# Patient Record
Sex: MALE | Race: WHITE | NOT HISPANIC OR LATINO
[De-identification: names, ages, dates, MRNs, and addresses within clinical notes are randomized per-mention and may not be internally consistent; named-entity substitution may affect disease eponyms.]

---

## 2017-01-11 ENCOUNTER — MESSAGE (OUTPATIENT)
Age: 67
End: 2017-01-11

## 2017-04-03 ENCOUNTER — APPOINTMENT (OUTPATIENT)
Dept: UROLOGY | Facility: CLINIC | Age: 67
End: 2017-04-03

## 2017-08-31 ENCOUNTER — MEDICATION RENEWAL (OUTPATIENT)
Age: 67
End: 2017-08-31

## 2017-11-29 ENCOUNTER — NON-APPOINTMENT (OUTPATIENT)
Age: 67
End: 2017-11-29

## 2017-11-29 ENCOUNTER — APPOINTMENT (OUTPATIENT)
Dept: INTERNAL MEDICINE | Facility: CLINIC | Age: 67
End: 2017-11-29
Payer: MEDICARE

## 2017-11-29 VITALS — SYSTOLIC BLOOD PRESSURE: 135 MMHG | DIASTOLIC BLOOD PRESSURE: 84 MMHG

## 2017-11-29 VITALS
SYSTOLIC BLOOD PRESSURE: 126 MMHG | HEART RATE: 72 BPM | DIASTOLIC BLOOD PRESSURE: 90 MMHG | TEMPERATURE: 97.8 F | HEIGHT: 68.5 IN | BODY MASS INDEX: 28.77 KG/M2 | WEIGHT: 192 LBS | OXYGEN SATURATION: 95 %

## 2017-11-29 DIAGNOSIS — Z12.11 ENCOUNTER FOR SCREENING FOR MALIGNANT NEOPLASM OF COLON: ICD-10-CM

## 2017-11-29 DIAGNOSIS — Z92.29 PERSONAL HISTORY OF OTHER DRUG THERAPY: ICD-10-CM

## 2017-11-29 DIAGNOSIS — Z87.09 PERSONAL HISTORY OF OTHER DISEASES OF THE RESPIRATORY SYSTEM: ICD-10-CM

## 2017-11-29 DIAGNOSIS — F41.9 ANXIETY DISORDER, UNSPECIFIED: ICD-10-CM

## 2017-11-29 DIAGNOSIS — Z23 ENCOUNTER FOR IMMUNIZATION: ICD-10-CM

## 2017-11-29 PROCEDURE — 99213 OFFICE O/P EST LOW 20 MIN: CPT | Mod: 25

## 2017-11-29 PROCEDURE — 82270 OCCULT BLOOD FECES: CPT

## 2017-11-29 PROCEDURE — G0008: CPT

## 2017-11-29 PROCEDURE — 93000 ELECTROCARDIOGRAM COMPLETE: CPT | Mod: 59

## 2017-11-29 PROCEDURE — G0438: CPT

## 2017-11-29 PROCEDURE — 90662 IIV NO PRSV INCREASED AG IM: CPT

## 2017-11-30 LAB
25(OH)D3 SERPL-MCNC: 34.8 NG/ML
APPEARANCE: CLEAR
BACTERIA: NEGATIVE
BASOPHILS # BLD AUTO: 0.01 K/UL
BASOPHILS NFR BLD AUTO: 0.2 %
BILIRUBIN URINE: NEGATIVE
BLOOD URINE: NEGATIVE
CHOLEST SERPL-MCNC: 228 MG/DL
CHOLEST/HDLC SERPL: 4.1 RATIO
COLOR: YELLOW
EOSINOPHIL # BLD AUTO: 0.09 K/UL
EOSINOPHIL NFR BLD AUTO: 1.5
GLUCOSE QUALITATIVE U: NEGATIVE MG/DL
HBA1C MFR BLD HPLC: 5.6 %
HCT VFR BLD CALC: 45.9 %
HDLC SERPL-MCNC: 55 MG/DL
HGB BLD-MCNC: 15.4 G/DL
HYALINE CASTS: 0 /LPF
IMM GRANULOCYTES NFR BLD AUTO: 0.2 %
KETONES URINE: NEGATIVE
LDLC SERPL CALC-MCNC: 153 MG/DL
LEUKOCYTE ESTERASE URINE: NEGATIVE
LYMPHOCYTES # BLD AUTO: 2.22 K/UL
LYMPHOCYTES NFR BLD AUTO: 36.5 %
MAN DIFF?: NORMAL
MCHC RBC-ENTMCNC: 33.6 PG
MICROSCOPIC-UA: NORMAL
MONOCYTES # BLD AUTO: 0.47 K/UL
MONOCYTES NFR BLD AUTO: 7.7 %
NEUTROPHILS # BLD AUTO: 3.29 K/UL
NEUTROPHILS NFR BLD AUTO: 53.9 %
NITRITE URINE: NEGATIVE
PH URINE: 5.5
PLATELET # BLD AUTO: 214 K/UL
PROTEIN URINE: NEGATIVE MG/DL
PSA SERPL-MCNC: 4 NG/ML
RBC # BLD: 4.58 M/UL
RED BLOOD CELLS URINE: 5 /HPF
SPECIFIC GRAVITY URINE: 1.02
SQUAMOUS EPITHELIAL CELLS: 0 /HPF
T4 FREE SERPL-MCNC: 1.3 NG/DL
TRIGL SERPL-MCNC: 100 MG/DL
TSH SERPL-ACNC: 1.96 UIU/ML
UROBILINOGEN URINE: NEGATIVE MG/DL
WHITE BLOOD CELLS URINE: 1 /HPF

## 2018-01-09 ENCOUNTER — CHART COPY (OUTPATIENT)
Age: 68
End: 2018-01-09

## 2018-02-28 ENCOUNTER — APPOINTMENT (OUTPATIENT)
Dept: INTERNAL MEDICINE | Facility: CLINIC | Age: 68
End: 2018-02-28
Payer: MEDICARE

## 2018-02-28 VITALS
BODY MASS INDEX: 27.2 KG/M2 | HEIGHT: 70 IN | OXYGEN SATURATION: 95 % | HEART RATE: 64 BPM | SYSTOLIC BLOOD PRESSURE: 120 MMHG | WEIGHT: 190 LBS | TEMPERATURE: 97.9 F | DIASTOLIC BLOOD PRESSURE: 80 MMHG

## 2018-02-28 VITALS — DIASTOLIC BLOOD PRESSURE: 78 MMHG | SYSTOLIC BLOOD PRESSURE: 114 MMHG

## 2018-02-28 LAB
ALBUMIN SERPL ELPH-MCNC: 4.1 G/DL
ALP BLD-CCNC: 39 U/L
ALT SERPL-CCNC: 29 U/L
ANION GAP SERPL CALC-SCNC: 15 MMOL/L
AST SERPL-CCNC: 24 U/L
BILIRUB SERPL-MCNC: 0.5 MG/DL
BUN SERPL-MCNC: 17 MG/DL
CALCIUM SERPL-MCNC: 9.2 MG/DL
CHLORIDE SERPL-SCNC: 95 MMOL/L
CO2 SERPL-SCNC: 21 MMOL/L
CREAT SERPL-MCNC: 0.92 MG/DL
GLUCOSE SERPL-MCNC: 98 MG/DL
POTASSIUM SERPL-SCNC: 4.1 MMOL/L
PROT SERPL-MCNC: 7.2 G/DL
SODIUM SERPL-SCNC: 131 MMOL/L

## 2018-02-28 PROCEDURE — 99214 OFFICE O/P EST MOD 30 MIN: CPT

## 2018-03-08 ENCOUNTER — RX RENEWAL (OUTPATIENT)
Age: 68
End: 2018-03-08

## 2018-03-13 LAB
CHOLEST SERPL-MCNC: 254 MG/DL
CHOLEST/HDLC SERPL: 4.6 RATIO
HBA1C MFR BLD HPLC: 5.8 %
HDLC SERPL-MCNC: 55 MG/DL
LDLC SERPL CALC-MCNC: 173 MG/DL
TRIGL SERPL-MCNC: 132 MG/DL

## 2018-03-17 LAB — HEMOCCULT STL QL IA: NEGATIVE

## 2018-05-22 ENCOUNTER — MESSAGE (OUTPATIENT)
Age: 68
End: 2018-05-22

## 2018-06-01 ENCOUNTER — APPOINTMENT (OUTPATIENT)
Dept: INTERNAL MEDICINE | Facility: CLINIC | Age: 68
End: 2018-06-01
Payer: MEDICARE

## 2018-06-01 VITALS — SYSTOLIC BLOOD PRESSURE: 138 MMHG | DIASTOLIC BLOOD PRESSURE: 88 MMHG

## 2018-06-01 VITALS
DIASTOLIC BLOOD PRESSURE: 90 MMHG | SYSTOLIC BLOOD PRESSURE: 134 MMHG | HEART RATE: 71 BPM | BODY MASS INDEX: 27.46 KG/M2 | HEIGHT: 70.5 IN | TEMPERATURE: 97.9 F | WEIGHT: 194 LBS | OXYGEN SATURATION: 94 %

## 2018-06-01 PROCEDURE — 36415 COLL VENOUS BLD VENIPUNCTURE: CPT

## 2018-06-01 PROCEDURE — 99214 OFFICE O/P EST MOD 30 MIN: CPT | Mod: 25

## 2018-06-26 LAB
ALBUMIN SERPL ELPH-MCNC: 4.4 G/DL
ALP BLD-CCNC: 42 U/L
ALT SERPL-CCNC: 26 U/L
ANA SER IF-ACNC: NEGATIVE
ANION GAP SERPL CALC-SCNC: 17 MMOL/L
AST SERPL-CCNC: 25 U/L
BASOPHILS # BLD AUTO: 0.02 K/UL
BASOPHILS NFR BLD AUTO: 0.3 %
BILIRUB SERPL-MCNC: 0.5 MG/DL
BUN SERPL-MCNC: 19 MG/DL
CALCIUM SERPL-MCNC: 9.3 MG/DL
CHLORIDE SERPL-SCNC: 95 MMOL/L
CO2 SERPL-SCNC: 22 MMOL/L
CREAT SERPL-MCNC: 0.85 MG/DL
EOSINOPHIL # BLD AUTO: 0.18 K/UL
EOSINOPHIL NFR BLD AUTO: 2.6 %
GLUCOSE SERPL-MCNC: 89 MG/DL
HCT VFR BLD CALC: 45.6 %
HGB BLD-MCNC: 15.4 G/DL
IMM GRANULOCYTES NFR BLD AUTO: 0.1 %
LYMPHOCYTES # BLD AUTO: 3.09 K/UL
LYMPHOCYTES NFR BLD AUTO: 45.2 %
MAN DIFF?: NORMAL
MCHC RBC-ENTMCNC: 33.3 PG
MCHC RBC-ENTMCNC: 33.8 GM/DL
MCV RBC AUTO: 98.5 FL
MONOCYTES # BLD AUTO: 0.45 K/UL
MONOCYTES NFR BLD AUTO: 6.6 %
NEUTROPHILS # BLD AUTO: 3.09 K/UL
NEUTROPHILS NFR BLD AUTO: 45.2 %
PLATELET # BLD AUTO: 221 K/UL
POTASSIUM SERPL-SCNC: 4.3 MMOL/L
PROT SERPL-MCNC: 7.5 G/DL
RBC # BLD: 4.63 M/UL
RBC # FLD: 14.1 %
RHEUMATOID FACT SER QL: <7 IU/ML
SODIUM SERPL-SCNC: 134 MMOL/L
WBC # FLD AUTO: 6.84 K/UL

## 2018-06-26 NOTE — HISTORY OF PRESENT ILLNESS
[de-identified] : Patient had been seeing Dr. Smith for trigger finger, and had two injections in the right 2nd digit.  He was told that surgery would be needed if this is not effective.  However, it was also noted by the patient and Dr. Smith that there was generalized swelling of the hands (mainly digits) bilaterally.  Patient says that he has pain in the hands when he is sitting or sleeping.  He sometimes wakes up during the night.  His nephew once gave him a vigorous handshake, and it was painful.  The symptoms are less problematic during the daytime.  There is no redness or warmth.\par \par Patient has been checking his blood pressure on his own.  The lowest SBP was 119, and the highest is generally in the 140s, but he once had 170.  The DBPs are mainly 70s-80s.  He has no headaches, dizziness, lightheadedness, chest pain, or dyspnea.  He reports that he is taking his medications regularly, but occasionally will skip it if his blood pressure is "good" (such as ).  He is not taking any decongestants.  He does drink about 8 cups of regular coffee daily, which he is reluctant to stop doing.\par \par Patient tolerates the lisinopril.  There is an occasional cough, which he feels is related to a post-nasal drip, and which responds to Claritin.\par \par For the hyperlipidemia, patient started the flush free niacin about 5/25/18.  He is tolerating it thus far.  He did not tolerate statins well and does not wish to return to them.\par \par \par he feels like something hanging in the throat, and sometimes coughs.  Claritin helps.

## 2018-06-26 NOTE — PHYSICAL EXAM
[No Respiratory Distress] : no respiratory distress  [Clear to Auscultation] : lungs were clear to auscultation bilaterally [No Accessory Muscle Use] : no accessory muscle use [Normal Rate] : normal rate  [Regular Rhythm] : with a regular rhythm [Normal S1, S2] : normal S1 and S2 [No Murmur] : no murmur heard [Soft] : abdomen soft [Non Tender] : non-tender [Non-distended] : non-distended [Normal Bowel Sounds] : normal bowel sounds [de-identified] : No LE edema.  Mild swelling in all 10 digits of the hands bilaterally.  No erythema. [de-identified] : Full ROM of the fingers.

## 2018-06-26 NOTE — ASSESSMENT
[FreeTextEntry1] : (1) Hypertension - patient with somewhat labile BP, but SBPs are >130 a considerable portion of the time.  Will raise the lisinopril to 20mg daily, and patient is to contact me if he has SBPs <100, or if he gets dizziness, lightheadedness, or other concerning symptoms.  He has an occasional cough that appears to be from post-nasal drip and responds to Claritin.\par \par (2) Hyperlipidemia - patient only started the flush free niacin 1 week ago, so will defer a lipid check to the next visit.\par \par (3) Hand swelling - unclear cause at this time.  Will send SHANTI, RF, and patient also has an appointment to see rheumatology for further evaluation.

## 2018-06-26 NOTE — ADDENDUM
[FreeTextEntry1] : 6/26/18 - Spoke with patient at 11:10AM.  SHANTI, RF negative.  Patient seeing rheumatology next month.  Na+ 134 (up from 131).  Patient to cut back on free water intake.

## 2018-07-30 ENCOUNTER — APPOINTMENT (OUTPATIENT)
Dept: RHEUMATOLOGY | Facility: CLINIC | Age: 68
End: 2018-07-30
Payer: MEDICARE

## 2018-07-30 VITALS
DIASTOLIC BLOOD PRESSURE: 88 MMHG | RESPIRATION RATE: 16 BRPM | HEIGHT: 70.5 IN | BODY MASS INDEX: 26.83 KG/M2 | OXYGEN SATURATION: 98 % | HEART RATE: 70 BPM | SYSTOLIC BLOOD PRESSURE: 131 MMHG | WEIGHT: 189.5 LBS | TEMPERATURE: 97.8 F

## 2018-07-30 DIAGNOSIS — M79.641 PAIN IN RIGHT HAND: ICD-10-CM

## 2018-07-30 DIAGNOSIS — M79.642 PAIN IN RIGHT HAND: ICD-10-CM

## 2018-07-30 PROCEDURE — 99204 OFFICE O/P NEW MOD 45 MIN: CPT

## 2018-07-30 RX ORDER — DICLOFENAC SODIUM 10 MG/G
1 GEL TOPICAL DAILY
Qty: 1 | Refills: 1 | Status: ACTIVE | COMMUNITY
Start: 2018-07-30 | End: 1900-01-01

## 2018-07-31 ENCOUNTER — APPOINTMENT (OUTPATIENT)
Dept: INTERNAL MEDICINE | Facility: CLINIC | Age: 68
End: 2018-07-31
Payer: MEDICARE

## 2018-07-31 VITALS
SYSTOLIC BLOOD PRESSURE: 138 MMHG | HEART RATE: 73 BPM | WEIGHT: 192 LBS | BODY MASS INDEX: 27.8 KG/M2 | DIASTOLIC BLOOD PRESSURE: 90 MMHG | OXYGEN SATURATION: 95 % | HEIGHT: 69.5 IN | TEMPERATURE: 98.3 F

## 2018-07-31 DIAGNOSIS — M79.89 OTHER SPECIFIED SOFT TISSUE DISORDERS: ICD-10-CM

## 2018-07-31 PROCEDURE — 99214 OFFICE O/P EST MOD 30 MIN: CPT | Mod: 25

## 2018-07-31 PROCEDURE — 36415 COLL VENOUS BLD VENIPUNCTURE: CPT

## 2018-07-31 RX ORDER — UBIDECARENONE/VIT E ACET 100MG-5
CAPSULE ORAL
Refills: 0 | Status: COMPLETED | COMMUNITY

## 2018-08-01 PROBLEM — M79.89 HAND SWELLING: Status: ACTIVE | Noted: 2018-06-01

## 2018-08-01 NOTE — PHYSICAL EXAM
[No Respiratory Distress] : no respiratory distress  [Clear to Auscultation] : lungs were clear to auscultation bilaterally [No Accessory Muscle Use] : no accessory muscle use [Normal Rate] : normal rate  [Regular Rhythm] : with a regular rhythm [Normal S1, S2] : normal S1 and S2 [No Murmur] : no murmur heard [No Edema] : there was no peripheral edema [Soft] : abdomen soft [Non Tender] : non-tender [Non-distended] : non-distended [No Masses] : no abdominal mass palpated [Normal Bowel Sounds] : normal bowel sounds

## 2018-08-01 NOTE — HISTORY OF PRESENT ILLNESS
[de-identified] : Patient for follow-up of hyperlipidemia, labile hypertension, and hyponatremia (last Na+ 134).  Patient is on the flush free niacin 500mg once daily since 5/25/18.  He reports that he has been tolerating it well.  He did not tolerate the statins well in the past.  For the hypertension, he is on lisinopril 20mg daily.\par \par Patient saw Dr. Garcia for the hand pain and stiffness, and a work-up is underway.  Patient has a script for blood work from Dr. Garcia, and he is also to get a repeat hand x-ray.  Patient requests that these bloods be drawn today.\par \par Patient had severe hip pain last month while he was [facility].  He saw Dr. Ameya Menjivar (orthopedics).  He was found to have herniated discs in the lower spine.  Patient is taking Aleve and Tylenol.  Patient was given naproxen 500mg bid (prescription) and cyclobenzaprine, although he hasn't started those medications yet.  He also got topical voltaren.  Patient noted interactions between lisinopril and NSAIDS (increased risk for MARIA DOLORES), and issues with BPH.  Patient also notes that he has an appointment with a Neurologist who is a certified medical marijuana provider, in the hopes of finding additional options for dealing with the pain.\par \par Patient is dealing with his mother's estate, and it is keeping him up at nights.

## 2018-08-01 NOTE — ASSESSMENT
[FreeTextEntry1] : (1) CV - patient's BP is 138/90.  He is concerned about what he has read about using ACE inhibitors and NSAIDs together, and their impact on the kidneys.  Will change patient from lisinopril to amlodipine 5mg daily.  Patient advised that the amlodipine can possibly cause an allergic reaction, GI upset/constipation, and leg swelling.  He is to call me if these occur.  For the lipids, patient has been on flush free niacin, and lipids were repeated today.   Patient is to follow-up with me in 1 month.\par \par (2) Ortho/pain - Patient also advised that the NSAIDs can cause worsening hypertension, gastritis/PUD, and kidney injury (even without the ACE inhibitor).  He can use the Voltaren Gel and Tylenol, and also the naproxen if he feels he needs it.  He says he has a script for physical therapy from the orthopedist.\par \par (3) Rheum - labs ordered by Dr. Church were ordered today.\par \par (4) Hyponatremia - Na+ 134 in recent visit, and will repeat today.

## 2018-08-02 ENCOUNTER — FORM ENCOUNTER (OUTPATIENT)
Age: 68
End: 2018-08-02

## 2018-08-03 ENCOUNTER — APPOINTMENT (OUTPATIENT)
Dept: RADIOLOGY | Facility: CLINIC | Age: 68
End: 2018-08-03
Payer: MEDICARE

## 2018-08-03 ENCOUNTER — OUTPATIENT (OUTPATIENT)
Dept: OUTPATIENT SERVICES | Facility: HOSPITAL | Age: 68
LOS: 1 days | End: 2018-08-03
Payer: MEDICARE

## 2018-08-03 DIAGNOSIS — Z00.8 ENCOUNTER FOR OTHER GENERAL EXAMINATION: ICD-10-CM

## 2018-08-03 PROCEDURE — 73130 X-RAY EXAM OF HAND: CPT

## 2018-08-03 PROCEDURE — 73130 X-RAY EXAM OF HAND: CPT | Mod: 26,50

## 2018-08-06 ENCOUNTER — RX RENEWAL (OUTPATIENT)
Age: 68
End: 2018-08-06

## 2018-08-24 ENCOUNTER — MEDICATION RENEWAL (OUTPATIENT)
Age: 68
End: 2018-08-24

## 2018-08-24 LAB
ALBUMIN SERPL ELPH-MCNC: 4.5 G/DL
ALP BLD-CCNC: 43 U/L
ALT SERPL-CCNC: 27 U/L
ANION GAP SERPL CALC-SCNC: 12 MMOL/L
AST SERPL-CCNC: 24 U/L
BASOPHILS # BLD AUTO: 0.02 K/UL
BASOPHILS NFR BLD AUTO: 0.3 %
BILIRUB SERPL-MCNC: 0.5 MG/DL
BUN SERPL-MCNC: 17 MG/DL
CALCIUM SERPL-MCNC: 9.3 MG/DL
CCP AB SER IA-ACNC: <8 UNITS
CHLORIDE SERPL-SCNC: 95 MMOL/L
CHOLEST SERPL-MCNC: 226 MG/DL
CHOLEST/HDLC SERPL: 4.2 RATIO
CO2 SERPL-SCNC: 23 MMOL/L
CREAT SERPL-MCNC: 0.92 MG/DL
CRP SERPL-MCNC: <0.1 MG/DL
EOSINOPHIL # BLD AUTO: 0.15 K/UL
EOSINOPHIL NFR BLD AUTO: 2.3 %
ERYTHROCYTE [SEDIMENTATION RATE] IN BLOOD BY WESTERGREN METHOD: 17 MM/HR
GLUCOSE SERPL-MCNC: 102 MG/DL
HCT VFR BLD CALC: 46.4 %
HDLC SERPL-MCNC: 54 MG/DL
HGB BLD-MCNC: 15.8 G/DL
IMM GRANULOCYTES NFR BLD AUTO: 0.2 %
LDLC SERPL CALC-MCNC: 147 MG/DL
LYMPHOCYTES # BLD AUTO: 2.86 K/UL
LYMPHOCYTES NFR BLD AUTO: 43.9 %
MAN DIFF?: NORMAL
MCHC RBC-ENTMCNC: 33.5 PG
MCHC RBC-ENTMCNC: 34.1 GM/DL
MCV RBC AUTO: 98.3 FL
MONOCYTES # BLD AUTO: 0.49 K/UL
MONOCYTES NFR BLD AUTO: 7.5 %
NEUTROPHILS # BLD AUTO: 2.99 K/UL
NEUTROPHILS NFR BLD AUTO: 45.8 %
PLATELET # BLD AUTO: 227 K/UL
POTASSIUM SERPL-SCNC: 4.6 MMOL/L
PROT SERPL-MCNC: 7.5 G/DL
RBC # BLD: 4.72 M/UL
RBC # FLD: 13.7 %
RF+CCP IGG SER-IMP: NEGATIVE
SODIUM SERPL-SCNC: 130 MMOL/L
TRIGL SERPL-MCNC: 123 MG/DL
TSH SERPL-ACNC: 3.07 UIU/ML
URATE SERPL-MCNC: 4.2 MG/DL
WBC # FLD AUTO: 6.52 K/UL

## 2018-09-26 ENCOUNTER — OTHER (OUTPATIENT)
Age: 68
End: 2018-09-26

## 2018-09-26 DIAGNOSIS — M25.649 STIFFNESS OF UNSPECIFIED HAND, NOT ELSEWHERE CLASSIFIED: ICD-10-CM

## 2018-10-02 ENCOUNTER — APPOINTMENT (OUTPATIENT)
Dept: INTERNAL MEDICINE | Facility: CLINIC | Age: 68
End: 2018-10-02
Payer: MEDICARE

## 2018-10-02 PROCEDURE — G0008: CPT

## 2018-10-02 PROCEDURE — 90662 IIV NO PRSV INCREASED AG IM: CPT

## 2018-11-11 ENCOUNTER — TRANSCRIPTION ENCOUNTER (OUTPATIENT)
Age: 68
End: 2018-11-11

## 2018-11-16 ENCOUNTER — APPOINTMENT (OUTPATIENT)
Dept: INTERNAL MEDICINE | Facility: CLINIC | Age: 68
End: 2018-11-16
Payer: MEDICARE

## 2018-11-16 VITALS
HEIGHT: 69.5 IN | DIASTOLIC BLOOD PRESSURE: 80 MMHG | SYSTOLIC BLOOD PRESSURE: 124 MMHG | OXYGEN SATURATION: 94 % | TEMPERATURE: 98.6 F | BODY MASS INDEX: 27.65 KG/M2 | HEART RATE: 74 BPM | WEIGHT: 191 LBS

## 2018-11-16 VITALS — DIASTOLIC BLOOD PRESSURE: 95 MMHG | SYSTOLIC BLOOD PRESSURE: 136 MMHG

## 2018-11-16 PROCEDURE — 99214 OFFICE O/P EST MOD 30 MIN: CPT

## 2018-11-16 RX ORDER — CYCLOBENZAPRINE HYDROCHLORIDE 5 MG/1
5 TABLET, FILM COATED ORAL
Qty: 90 | Refills: 0 | Status: COMPLETED | COMMUNITY
Start: 2018-08-23

## 2018-11-16 RX ORDER — NAPROXEN 500 MG/1
500 TABLET ORAL
Qty: 60 | Refills: 0 | Status: COMPLETED | COMMUNITY
Start: 2018-07-30

## 2018-11-16 RX ORDER — AMLODIPINE BESYLATE 5 MG/1
5 TABLET ORAL
Qty: 30 | Refills: 3 | Status: DISCONTINUED | COMMUNITY
Start: 2018-07-31 | End: 2018-11-16

## 2018-11-25 ENCOUNTER — TRANSCRIPTION ENCOUNTER (OUTPATIENT)
Age: 68
End: 2018-11-25

## 2018-11-29 ENCOUNTER — MESSAGE (OUTPATIENT)
Age: 68
End: 2018-11-29

## 2018-11-29 NOTE — PHYSICAL EXAM
[No Respiratory Distress] : no respiratory distress  [Clear to Auscultation] : lungs were clear to auscultation bilaterally [No Accessory Muscle Use] : no accessory muscle use [Normal Rate] : normal rate  [Regular Rhythm] : with a regular rhythm [Normal S1, S2] : normal S1 and S2 [No Murmur] : no murmur heard [No Edema] : there was no peripheral edema [Soft] : abdomen soft [Non Tender] : non-tender [Non-distended] : non-distended [No Masses] : no abdominal mass palpated [Normal Bowel Sounds] : normal bowel sounds [Normal Voice/Communication] : normal voice/communication [Normal Outer Ear/Nose] : the outer ears and nose were normal in appearance [Normal Oropharynx] : the oropharynx was normal [Normal TMs] : both tympanic membranes were normal [Normal Nasal Mucosa] : the nasal mucosa was normal [de-identified] : No sinus tenderness.

## 2018-11-29 NOTE — HISTORY OF PRESENT ILLNESS
[de-identified] : Patient requests having his BP checked.  Patient takes it every morning with an arm cuff.  Over the past week, it has been 124/91, 133/92, 132/92, 137/100, 142/99, 144/99, 116/98.  He has had a URI during that time.  When feeling well, it has been 121/94, 119/95, 132/97, 131/95, 116/91.\par \par Patient was seen in urgent care recently.  He had a negative throat culture.  No antibiotics were needed.  /98.  He has been using Coricidn HBP and fluticasone nasal spray.\par \par Patient has finished the naproxen, and he can go back onto the lisinopril (he was holding the lisinopril as he read about how the combination and cause worsening renal function).\par \par Patient is not taking the tamsulosin.

## 2018-11-29 NOTE — ASSESSMENT
[FreeTextEntry1] : Patient's BP is elevated at times, likely exacerbated by the URI.  Patient given a renewal of the lisinopril, and I would recommend continuing lisinopril 20mg daily.  He can discontinue the amlodipine, which was a temporary replacement.  Patient's URI symptoms are mild at this time, and he can be observed off antibiotics.

## 2018-12-05 ENCOUNTER — APPOINTMENT (OUTPATIENT)
Dept: INTERNAL MEDICINE | Facility: CLINIC | Age: 68
End: 2018-12-05

## 2018-12-26 ENCOUNTER — NON-APPOINTMENT (OUTPATIENT)
Age: 68
End: 2018-12-26

## 2018-12-26 ENCOUNTER — APPOINTMENT (OUTPATIENT)
Dept: INTERNAL MEDICINE | Facility: CLINIC | Age: 68
End: 2018-12-26
Payer: MEDICARE

## 2018-12-26 VITALS
HEART RATE: 64 BPM | WEIGHT: 192 LBS | BODY MASS INDEX: 27.8 KG/M2 | SYSTOLIC BLOOD PRESSURE: 132 MMHG | DIASTOLIC BLOOD PRESSURE: 88 MMHG | TEMPERATURE: 98.3 F | OXYGEN SATURATION: 94 % | HEIGHT: 69.5 IN

## 2018-12-26 PROCEDURE — 36415 COLL VENOUS BLD VENIPUNCTURE: CPT

## 2018-12-26 PROCEDURE — 99213 OFFICE O/P EST LOW 20 MIN: CPT | Mod: 25

## 2018-12-26 PROCEDURE — 93000 ELECTROCARDIOGRAM COMPLETE: CPT | Mod: 59

## 2018-12-26 PROCEDURE — G0439: CPT

## 2018-12-26 PROCEDURE — G0444 DEPRESSION SCREEN ANNUAL: CPT | Mod: 59

## 2019-01-31 NOTE — ASSESSMENT
[FreeTextEntry1] : (1) HCM - discussed diet, exercise, weight maintenance.  Labs ordered as below.  Hepatitis C screening was negative in 2014.  HIV testing offered to patient and patient declined.  Prevnar UTD (10/31/16), and he has received Pneumovax (1996, 2003), Zostavax (10/28/15), and Tdap (July 2011).  He can get the Shingrix in 2020 (5 years after the Zostavax).  Influenza vaccination given already this season.  FIT negative 3/13/2018, and patient can repeat in several months.  Dermatology follow-up for skin cancer screening.  Patient was given written information on preparing a Health Care Proxy.\par \par (2) CV - patient's blood pressure is adequately controlled on lisinopril 10mg daily.  Patient is on niacin for hyperlipidemia.\par \par (3)  - continue tamsulosin as per Dr. Scales.  Patient was reminded to follow-up with urology for imaging.

## 2019-01-31 NOTE — PHYSICAL EXAM
[No Acute Distress] : no acute distress [Well Nourished] : well nourished [Well Developed] : well developed [Well-Appearing] : well-appearing [Normal Voice/Communication] : normal voice/communication [Normal Sclera/Conjunctiva] : normal sclera/conjunctiva [PERRL] : pupils equal round and reactive to light [EOMI] : extraocular movements intact [Normal Outer Ear/Nose] : the outer ears and nose were normal in appearance [Normal Oropharynx] : the oropharynx was normal [Normal TMs] : both tympanic membranes were normal [No JVD] : no jugular venous distention [Supple] : supple [No Lymphadenopathy] : no lymphadenopathy [Thyroid Normal, No Nodules] : the thyroid was normal and there were no nodules present [No Respiratory Distress] : no respiratory distress  [Clear to Auscultation] : lungs were clear to auscultation bilaterally [No Accessory Muscle Use] : no accessory muscle use [Normal Rate] : normal rate  [Regular Rhythm] : with a regular rhythm [Normal S1, S2] : normal S1 and S2 [No Murmur] : no murmur heard [No Carotid Bruits] : no carotid bruits [No Abdominal Bruit] : a ~M bruit was not heard ~T in the abdomen [No Varicosities] : no varicosities [Pedal Pulses Present] : the pedal pulses are present [No Edema] : there was no peripheral edema [No Extremity Clubbing/Cyanosis] : no extremity clubbing/cyanosis [No Palpable Aorta] : no palpable aorta [Soft] : abdomen soft [Non Tender] : non-tender [Non-distended] : non-distended [No Masses] : no abdominal mass palpated [No HSM] : no HSM [Normal Bowel Sounds] : normal bowel sounds [Normal Sphincter Tone] : normal sphincter tone [No Mass] : no mass [Normal Posterior Cervical Nodes] : no posterior cervical lymphadenopathy [Normal Anterior Cervical Nodes] : no anterior cervical lymphadenopathy [No CVA Tenderness] : no CVA  tenderness [No Spinal Tenderness] : no spinal tenderness [No Joint Swelling] : no joint swelling [Grossly Normal Strength/Tone] : grossly normal strength/tone [No Rash] : no rash [Normal Gait] : normal gait [Coordination Grossly Intact] : coordination grossly intact [No Focal Deficits] : no focal deficits [Deep Tendon Reflexes (DTR)] : deep tendon reflexes were 2+ and symmetric [Normal Affect] : the affect was normal [Normal Insight/Judgement] : insight and judgment were intact [Stool Occult Blood] : stool negative for occult blood [Urethral Meatus] : meatus normal [Urinary Bladder Findings] : the bladder was normal on palpation [Scrotum] : the scrotum was normal [Testes Mass (___cm)] : there were no testicular masses [Prostate Tenderness] : the prostate was not tender [No Prostate Nodules] : no prostate nodules

## 2019-01-31 NOTE — HISTORY OF PRESENT ILLNESS
[de-identified] : Patient presents for a subsequent Medicare Annual Wellness Visit.\par \par For the past week, patient gets some tenderness over a right lower rib, felt when he turns his body.

## 2019-01-31 NOTE — HEALTH RISK ASSESSMENT
[Very Good] : ~his/her~  mood as very good [No falls in past year] : Patient reported no falls in the past year [0] : 2) Feeling down, depressed, or hopeless: Not at all (0) [Patient declined colonoscopy] : Patient declined colonoscopy [Retired] : retired [Single] : single [None] : None [Alone] : lives alone [Feels Safe at Home] : Feels safe at home [Fully functional (bathing, dressing, toileting, transferring, walking, feeding)] : Fully functional (bathing, dressing, toileting, transferring, walking, feeding) [Fully functional (using the telephone, shopping, preparing meals, housekeeping, doing laundry, using] : Fully functional and needs no help or supervision to perform IADLs (using the telephone, shopping, preparing meals, housekeeping, doing laundry, using transportation, managing medications and managing finances) [Smoke Detector] : smoke detector [Carbon Monoxide Detector] : carbon monoxide detector [Seat Belt] :  uses seat belt [Sunscreen] : uses sunscreen [Discussed at today's visit] : Advance Directives Discussed at today's visit [HIV test declined] : HIV test declined [] : No [de-identified] : Several drinks per week; occasionally wine when out to dinner. [[id number]] : 0 [Change in mental status noted] : No change in mental status noted [Language] : denies difficulty with language [Behavior] : denies difficulty with behavior [Handling Complex Tasks] : denies difficulty handling complex tasks [Reasoning] : denies difficulty with reasoning [Sexually Active] : not sexually active [Reports changes in hearing] : Reports no changes in hearing [Reports changes in vision] : Reports no changes in vision [Reports changes in dental health] : Reports no changes in dental health [ColonoscopyComments] : FIT negative 3/13/2018 [HepatitisCDate] : 08/07/2014 [HepatitisCComments] : Negative [FreeTextEntry2] : Retired  [de-identified] : No h/o STDs. [de-identified] : Glasses, astigmatism, nearsighted.  Sees ophthalmologist. [de-identified] : Going regularly. [FreeTextEntry4] : Patient given Health Care Proxy information today.

## 2019-04-01 ENCOUNTER — MESSAGE (OUTPATIENT)
Age: 69
End: 2019-04-01

## 2019-04-17 ENCOUNTER — APPOINTMENT (OUTPATIENT)
Dept: INTERNAL MEDICINE | Facility: CLINIC | Age: 69
End: 2019-04-17
Payer: MEDICARE

## 2019-04-17 VITALS
TEMPERATURE: 97.7 F | SYSTOLIC BLOOD PRESSURE: 132 MMHG | OXYGEN SATURATION: 98 % | HEART RATE: 70 BPM | WEIGHT: 191 LBS | DIASTOLIC BLOOD PRESSURE: 90 MMHG | BODY MASS INDEX: 27.8 KG/M2

## 2019-04-17 VITALS — DIASTOLIC BLOOD PRESSURE: 90 MMHG | SYSTOLIC BLOOD PRESSURE: 144 MMHG

## 2019-04-17 LAB
25(OH)D3 SERPL-MCNC: 29.9 NG/ML
ALBUMIN SERPL ELPH-MCNC: 4 G/DL
ALP BLD-CCNC: 51 U/L
ALT SERPL-CCNC: 32 U/L
ANION GAP SERPL CALC-SCNC: 12 MMOL/L
APPEARANCE: CLEAR
AST SERPL-CCNC: 28 U/L
BACTERIA: NEGATIVE
BASOPHILS # BLD AUTO: 0.02 K/UL
BASOPHILS NFR BLD AUTO: 0.4 %
BILIRUB SERPL-MCNC: 0.2 MG/DL
BILIRUBIN URINE: NEGATIVE
BLOOD URINE: ABNORMAL
BUN SERPL-MCNC: 17 MG/DL
CALCIUM SERPL-MCNC: 9.1 MG/DL
CHLORIDE SERPL-SCNC: 100 MMOL/L
CHOLEST SERPL-MCNC: 217 MG/DL
CHOLEST/HDLC SERPL: 4.8 RATIO
CO2 SERPL-SCNC: 22 MMOL/L
COLOR: YELLOW
CREAT SERPL-MCNC: 0.89 MG/DL
EOSINOPHIL # BLD AUTO: 0.14 K/UL
EOSINOPHIL NFR BLD AUTO: 2.6 %
GLUCOSE QUALITATIVE U: NEGATIVE MG/DL
GLUCOSE SERPL-MCNC: 95 MG/DL
HBA1C MFR BLD HPLC: 5.9 %
HCT VFR BLD CALC: 45.3 %
HDLC SERPL-MCNC: 45 MG/DL
HEMOCCULT STL QL IA: NEGATIVE
HGB BLD-MCNC: 15.4 G/DL
HYALINE CASTS: 1 /LPF
IMM GRANULOCYTES NFR BLD AUTO: 0.4 %
KETONES URINE: NEGATIVE
LDLC SERPL CALC-MCNC: 137 MG/DL
LEUKOCYTE ESTERASE URINE: NEGATIVE
LYMPHOCYTES # BLD AUTO: 2.44 K/UL
LYMPHOCYTES NFR BLD AUTO: 44.8 %
MAN DIFF?: NORMAL
MCHC RBC-ENTMCNC: 32.5 PG
MCHC RBC-ENTMCNC: 34 GM/DL
MCV RBC AUTO: 95.6 FL
MICROSCOPIC-UA: NORMAL
MONOCYTES # BLD AUTO: 0.61 K/UL
MONOCYTES NFR BLD AUTO: 11.2 %
NEUTROPHILS # BLD AUTO: 2.22 K/UL
NEUTROPHILS NFR BLD AUTO: 40.6 %
NITRITE URINE: NEGATIVE
PH URINE: 5.5
PLATELET # BLD AUTO: 204 K/UL
POTASSIUM SERPL-SCNC: 4.2 MMOL/L
PROT SERPL-MCNC: 7.3 G/DL
PROTEIN URINE: NEGATIVE MG/DL
PSA SERPL-MCNC: 5.32 NG/ML
RBC # BLD: 4.74 M/UL
RBC # FLD: 13.1 %
RED BLOOD CELLS URINE: 2 /HPF
SODIUM SERPL-SCNC: 134 MMOL/L
SPECIFIC GRAVITY URINE: 1.02
SQUAMOUS EPITHELIAL CELLS: 0 /HPF
T4 FREE SERPL-MCNC: 1.1 NG/DL
TRIGL SERPL-MCNC: 177 MG/DL
TSH SERPL-ACNC: 2.86 UIU/ML
UROBILINOGEN URINE: NEGATIVE MG/DL
WBC # FLD AUTO: 5.45 K/UL
WHITE BLOOD CELLS URINE: 1 /HPF

## 2019-04-17 PROCEDURE — 36415 COLL VENOUS BLD VENIPUNCTURE: CPT

## 2019-04-17 PROCEDURE — 99214 OFFICE O/P EST MOD 30 MIN: CPT | Mod: 25

## 2019-04-22 LAB
ALBUMIN SERPL ELPH-MCNC: 4.3 G/DL
ALP BLD-CCNC: 48 U/L
ALT SERPL-CCNC: 27 U/L
ANION GAP SERPL CALC-SCNC: 12 MMOL/L
AST SERPL-CCNC: 23 U/L
BILIRUB SERPL-MCNC: 0.6 MG/DL
BUN SERPL-MCNC: 19 MG/DL
CALCIUM SERPL-MCNC: 9.4 MG/DL
CHLORIDE SERPL-SCNC: 96 MMOL/L
CO2 SERPL-SCNC: 26 MMOL/L
CREAT SERPL-MCNC: 0.96 MG/DL
ESTIMATED AVERAGE GLUCOSE: 117 MG/DL
GLUCOSE SERPL-MCNC: 91 MG/DL
HBA1C MFR BLD HPLC: 5.7 %
LDLC SERPL DIRECT ASSAY-MCNC: 172 MG/DL
POTASSIUM SERPL-SCNC: 4.6 MMOL/L
PROT SERPL-MCNC: 7.1 G/DL
PSA SERPL-MCNC: 4.05 NG/ML
SODIUM SERPL-SCNC: 134 MMOL/L

## 2019-04-22 NOTE — HISTORY OF PRESENT ILLNESS
[de-identified] : Patient presents for follow-up of hypertension and prediabetes.  Patient's last A1c was 5.9% and has been relatively stable.  Patient reports that his diet has been good, although he occasionally indulges in sweets.  For the BP, he checks it on his own, and got 145/96 with an arm band recently.  Other readings yesterday were 134/94, 135/92, 121/83.\par \par Patient says he will return to urology for the mildly elevated PSA.

## 2019-04-22 NOTE — ASSESSMENT
[FreeTextEntry1] : (1) CV - for elevated BP, will raise the lisinopril to 30mg daily.  Patient will continue to self check his BP, and will contact me for SBP <100, or for symptoms of dizziness, lightheadedness, near syncope, or syncope.  Labs ordered in office as below.\par \par (2)  - follow-up with urology for elevated PSA.

## 2019-04-22 NOTE — PHYSICAL EXAM
[Clear to Auscultation] : lungs were clear to auscultation bilaterally [No Respiratory Distress] : no respiratory distress  [No Accessory Muscle Use] : no accessory muscle use [Regular Rhythm] : with a regular rhythm [Normal Rate] : normal rate  [Normal S1, S2] : normal S1 and S2 [No Murmur] : no murmur heard [No Edema] : there was no peripheral edema [Soft] : abdomen soft [Non-distended] : non-distended [Non Tender] : non-tender [Normal Bowel Sounds] : normal bowel sounds [No Masses] : no abdominal mass palpated [Normal Posterior Cervical Nodes] : no posterior cervical lymphadenopathy [Normal Anterior Cervical Nodes] : no anterior cervical lymphadenopathy

## 2019-04-22 NOTE — HISTORY OF PRESENT ILLNESS
[de-identified] : Patient presents for follow-up of hypertension and prediabetes.  Patient's last A1c was 5.9% and has been relatively stable.  Patient reports that his diet has been good, although he occasionally indulges in sweets.  For the BP, he checks it on his own, and got 145/96 with an arm band recently.  Other readings yesterday were 134/94, 135/92, 121/83.\par \par Patient says he will return to urology for the mildly elevated PSA.

## 2019-04-22 NOTE — PHYSICAL EXAM
[Clear to Auscultation] : lungs were clear to auscultation bilaterally [No Respiratory Distress] : no respiratory distress  [No Accessory Muscle Use] : no accessory muscle use [Regular Rhythm] : with a regular rhythm [Normal Rate] : normal rate  [Normal S1, S2] : normal S1 and S2 [No Murmur] : no murmur heard [No Edema] : there was no peripheral edema [Soft] : abdomen soft [Non Tender] : non-tender [Non-distended] : non-distended [No Masses] : no abdominal mass palpated [Normal Bowel Sounds] : normal bowel sounds [Normal Posterior Cervical Nodes] : no posterior cervical lymphadenopathy [Normal Anterior Cervical Nodes] : no anterior cervical lymphadenopathy

## 2019-07-17 ENCOUNTER — APPOINTMENT (OUTPATIENT)
Dept: INTERNAL MEDICINE | Facility: CLINIC | Age: 69
End: 2019-07-17

## 2019-07-23 ENCOUNTER — MEDICATION RENEWAL (OUTPATIENT)
Age: 69
End: 2019-07-23

## 2019-08-07 ENCOUNTER — APPOINTMENT (OUTPATIENT)
Dept: INTERNAL MEDICINE | Facility: CLINIC | Age: 69
End: 2019-08-07
Payer: MEDICARE

## 2019-08-07 VITALS
WEIGHT: 191 LBS | HEART RATE: 75 BPM | HEIGHT: 69.5 IN | DIASTOLIC BLOOD PRESSURE: 70 MMHG | SYSTOLIC BLOOD PRESSURE: 120 MMHG | OXYGEN SATURATION: 95 % | BODY MASS INDEX: 27.65 KG/M2 | TEMPERATURE: 98 F

## 2019-08-07 VITALS — DIASTOLIC BLOOD PRESSURE: 82 MMHG | SYSTOLIC BLOOD PRESSURE: 136 MMHG

## 2019-08-07 PROCEDURE — 36415 COLL VENOUS BLD VENIPUNCTURE: CPT

## 2019-08-07 PROCEDURE — 99214 OFFICE O/P EST MOD 30 MIN: CPT | Mod: 25

## 2019-08-07 RX ORDER — ACETAMINOPHEN 325 MG/1
TABLET, FILM COATED ORAL
Refills: 0 | Status: COMPLETED | COMMUNITY
End: 2019-08-07

## 2019-08-19 NOTE — HISTORY OF PRESENT ILLNESS
[de-identified] : Patient presents for follow-up of hypertension and prediabetes.  Patient's last A1c was 5.9% and has been relatively stable.  Patient reports that his diet has been good, although he occasionally indulges in sweets.  He usually gets 140s/80s with with a wrist cuff.\par \par Patient is on Niacin flush free 1000mg.  He takes a 500mg tablet in the AM, but sometimes forgets the evening dose.  He will take 2 tablets in the AM.\par \par no cp, dyspnea, palpitation.  no flushing.\par just came back from his [facility] home.\par \par Patient says he will return to urology for the mildly elevated PSA.

## 2019-08-19 NOTE — ASSESSMENT
[FreeTextEntry1] : (1) CV - for elevated BP, will raise the lisinopril to 30mg daily (a script for an additional 10mg was given to use with his 20mg tablets).  Patient will continue to self check his BP, and will contact me for SBP <100, or for symptoms of dizziness, lightheadedness, near syncope, or syncope.  Labs ordered in office as below.  Patient will take the flush free niacin as 2 tablets daily (together).\par \par (2)  - follow-up with urology for elevated PSA.

## 2019-08-19 NOTE — PHYSICAL EXAM
[No Respiratory Distress] : no respiratory distress  [Clear to Auscultation] : lungs were clear to auscultation bilaterally [No Accessory Muscle Use] : no accessory muscle use [Normal Rate] : normal rate  [Regular Rhythm] : with a regular rhythm [Normal S1, S2] : normal S1 and S2 [No Murmur] : no murmur heard [Non-distended] : non-distended [Soft] : abdomen soft [Non Tender] : non-tender [No Masses] : no abdominal mass palpated [Normal Bowel Sounds] : normal bowel sounds [Normal Anterior Cervical Nodes] : no anterior cervical lymphadenopathy [Normal Posterior Cervical Nodes] : no posterior cervical lymphadenopathy [No Edema] : there was no peripheral edema [Normal] : soft, non-tender, non-distended, no masses palpated, no HSM and normal bowel sounds

## 2019-11-06 ENCOUNTER — APPOINTMENT (OUTPATIENT)
Dept: INTERNAL MEDICINE | Facility: CLINIC | Age: 69
End: 2019-11-06
Payer: MEDICARE

## 2019-11-06 ENCOUNTER — NON-APPOINTMENT (OUTPATIENT)
Age: 69
End: 2019-11-06

## 2019-11-06 VITALS
WEIGHT: 191 LBS | HEART RATE: 65 BPM | OXYGEN SATURATION: 96 % | SYSTOLIC BLOOD PRESSURE: 126 MMHG | TEMPERATURE: 97.9 F | BODY MASS INDEX: 27.65 KG/M2 | HEIGHT: 69.5 IN | DIASTOLIC BLOOD PRESSURE: 86 MMHG

## 2019-11-06 DIAGNOSIS — Z23 ENCOUNTER FOR IMMUNIZATION: ICD-10-CM

## 2019-11-06 LAB
CHOLEST SERPL-MCNC: 220 MG/DL
CHOLEST/HDLC SERPL: 4.2 RATIO
ESTIMATED AVERAGE GLUCOSE: 117 MG/DL
HBA1C MFR BLD HPLC: 5.7 %
HDLC SERPL-MCNC: 52 MG/DL
LDLC SERPL CALC-MCNC: 147 MG/DL
TRIGL SERPL-MCNC: 106 MG/DL

## 2019-11-06 PROCEDURE — 36415 COLL VENOUS BLD VENIPUNCTURE: CPT

## 2019-11-06 PROCEDURE — 90653 IIV ADJUVANT VACCINE IM: CPT

## 2019-11-06 PROCEDURE — G0008: CPT

## 2019-11-06 PROCEDURE — 99213 OFFICE O/P EST LOW 20 MIN: CPT | Mod: 25

## 2019-11-06 PROCEDURE — G0439: CPT

## 2019-11-06 PROCEDURE — G0444 DEPRESSION SCREEN ANNUAL: CPT | Mod: 59

## 2019-11-16 NOTE — ASSESSMENT
[FreeTextEntry1] : (1) HCM - discussed diet, exercise, weight maintenance.  Labs ordered in office as below.  Hepatitis C screening was negative in 2014.  influenza vaccination   HIV testing offered to patient and patient declined.  Prevnar UTD (10/31/16), and he has received Pneumovax (1996, 2003), Zostavax (10/28/15), and Tdap (July 2011).  He can get the Shingrix (in a pharmacy due to his insurance).  Influenza vaccination given today.  FIT negative this year, and can be repeated annually through age 75.  Dermatology follow-up for skin cancer screening.  Patient was given written information on preparing a Health Care Proxy.\par \par (2) CV - patient's blood pressure is adequately controlled on lisinopril 40mg daily.  Patient is on niacin for hyperlipidemia.\par \par (3)  - continue tamsulosin as per Dr. Scales.  Patient was reminded to follow-up with urology for imaging.

## 2019-11-16 NOTE — PHYSICAL EXAM
[No Acute Distress] : no acute distress [Well Nourished] : well nourished [Well Developed] : well developed [Well-Appearing] : well-appearing [Normal Sclera/Conjunctiva] : normal sclera/conjunctiva [PERRL] : pupils equal round and reactive to light [EOMI] : extraocular movements intact [Normal Outer Ear/Nose] : the outer ears and nose were normal in appearance [Normal Oropharynx] : the oropharynx was normal [No JVD] : no jugular venous distention [No Lymphadenopathy] : no lymphadenopathy [Supple] : supple [Thyroid Normal, No Nodules] : the thyroid was normal and there were no nodules present [No Respiratory Distress] : no respiratory distress  [No Accessory Muscle Use] : no accessory muscle use [Clear to Auscultation] : lungs were clear to auscultation bilaterally [Normal Rate] : normal rate  [Regular Rhythm] : with a regular rhythm [Normal S1, S2] : normal S1 and S2 [No Murmur] : no murmur heard [No Carotid Bruits] : no carotid bruits [No Abdominal Bruit] : a ~M bruit was not heard ~T in the abdomen [No Varicosities] : no varicosities [Pedal Pulses Present] : the pedal pulses are present [No Edema] : there was no peripheral edema [No Palpable Aorta] : no palpable aorta [No Extremity Clubbing/Cyanosis] : no extremity clubbing/cyanosis [Soft] : abdomen soft [Non Tender] : non-tender [Non-distended] : non-distended [No Masses] : no abdominal mass palpated [No HSM] : no HSM [Normal Bowel Sounds] : normal bowel sounds [Normal Posterior Cervical Nodes] : no posterior cervical lymphadenopathy [Normal Anterior Cervical Nodes] : no anterior cervical lymphadenopathy [No CVA Tenderness] : no CVA  tenderness [No Spinal Tenderness] : no spinal tenderness [No Joint Swelling] : no joint swelling [Grossly Normal Strength/Tone] : grossly normal strength/tone [No Rash] : no rash [Coordination Grossly Intact] : coordination grossly intact [No Focal Deficits] : no focal deficits [Normal Gait] : normal gait [Deep Tendon Reflexes (DTR)] : deep tendon reflexes were 2+ and symmetric [Normal Affect] : the affect was normal [Normal Insight/Judgement] : insight and judgment were intact [Normal Voice/Communication] : normal voice/communication [No Hernias] : no hernias [Normal Sphincter Tone] : normal sphincter tone [No Mass] : no mass [Stool Occult Blood] : stool negative for occult blood

## 2019-11-16 NOTE — HISTORY OF PRESENT ILLNESS
[de-identified] : Patient presents for a subsequent Medicare Annual Wellness Visit.\par \par For the past week, patient gets some tenderness over a right lower rib, felt when he turns his body.  He also gets occasional shoulder pains.  He tried sleeping on his back, but wakes up with forearms and hands feeling numb.\par \par BP ranging 111-140s/70s-80s, and usually 130s.  Patient wondering if he should take his BP medication at night - he read about a study in  Heart Journal recently in the news.\par \par Patient is not taking the tamsulosin.

## 2019-11-16 NOTE — HEALTH RISK ASSESSMENT
[Very Good] : ~his/her~  mood as very good [No falls in past year] : Patient reported no falls in the past year [0] : 2) Feeling down, depressed, or hopeless: Not at all (0) [Patient declined colonoscopy] : Patient declined colonoscopy [HIV test declined] : HIV test declined [None] : None [Alone] : lives alone [Retired] : retired [Single] : single [Feels Safe at Home] : Feels safe at home [Fully functional (bathing, dressing, toileting, transferring, walking, feeding)] : Fully functional (bathing, dressing, toileting, transferring, walking, feeding) [Fully functional (using the telephone, shopping, preparing meals, housekeeping, doing laundry, using] : Fully functional and needs no help or supervision to perform IADLs (using the telephone, shopping, preparing meals, housekeeping, doing laundry, using transportation, managing medications and managing finances) [Smoke Detector] : smoke detector [Carbon Monoxide Detector] : carbon monoxide detector [Seat Belt] :  uses seat belt [Sunscreen] : uses sunscreen [Discussed at today's visit] : Advance Directives Discussed at today's visit [Yes] : Yes [2 - 3 times a week (3 pts)] : 2 - 3  times a week (3 points) [Never (0 pts)] : Never (0 points) [No] : In the past 12 months have you used drugs other than those required for medical reasons? No [With Patient/Caregiver] : With Patient/Caregiver [] : No [de-identified] : Several drinks per week; occasionally wine when out to dinner. [[id number]] : 0 [Change in mental status noted] : No change in mental status noted [Language] : denies difficulty with language [Behavior] : denies difficulty with behavior [Handling Complex Tasks] : denies difficulty handling complex tasks [Reasoning] : denies difficulty with reasoning [Sexually Active] : not sexually active [Reports changes in hearing] : Reports no changes in hearing [Reports changes in vision] : Reports no changes in vision [Reports changes in dental health] : Reports no changes in dental health [ColonoscopyComments] : FIT negative 4/2/19 [HepatitisCDate] : 08/07/2014 [HepatitisCComments] : Negative [FreeTextEntry2] : Retired  [de-identified] : No h/o STDs. [de-identified] : Trouble with noisy environment. [de-identified] : Glasses, astigmatism, nearsighted.  Sees ophthalmologist. [de-identified] : Going regularly. [AdvancecareDate] : 11/06/2019 [FreeTextEntry4] : Patient given Health Care Proxy information today.

## 2019-11-18 ENCOUNTER — MEDICATION RENEWAL (OUTPATIENT)
Age: 69
End: 2019-11-18

## 2019-12-03 LAB
25(OH)D3 SERPL-MCNC: 45.7 NG/ML
ALBUMIN SERPL ELPH-MCNC: 4.5 G/DL
ALP BLD-CCNC: 54 U/L
ALT SERPL-CCNC: 26 U/L
ANION GAP SERPL CALC-SCNC: 12 MMOL/L
APPEARANCE: CLEAR
AST SERPL-CCNC: 22 U/L
BACTERIA: NEGATIVE
BASOPHILS # BLD AUTO: 0.03 K/UL
BASOPHILS NFR BLD AUTO: 0.5 %
BILIRUB SERPL-MCNC: 0.4 MG/DL
BILIRUBIN URINE: NEGATIVE
BLOOD URINE: NEGATIVE
BUN SERPL-MCNC: 16 MG/DL
CALCIUM SERPL-MCNC: 9.2 MG/DL
CHLORIDE SERPL-SCNC: 96 MMOL/L
CHOLEST SERPL-MCNC: 228 MG/DL
CHOLEST/HDLC SERPL: 4.4 RATIO
CO2 SERPL-SCNC: 25 MMOL/L
COLOR: YELLOW
CREAT SERPL-MCNC: 0.9 MG/DL
EOSINOPHIL # BLD AUTO: 0.19 K/UL
EOSINOPHIL NFR BLD AUTO: 3.1 %
ESTIMATED AVERAGE GLUCOSE: 114 MG/DL
GLUCOSE QUALITATIVE U: NEGATIVE
GLUCOSE SERPL-MCNC: 93 MG/DL
HBA1C MFR BLD HPLC: 5.6 %
HCT VFR BLD CALC: 47.8 %
HDLC SERPL-MCNC: 52 MG/DL
HGB BLD-MCNC: 15.9 G/DL
HYALINE CASTS: 0 /LPF
IMM GRANULOCYTES NFR BLD AUTO: 0.2 %
KETONES URINE: NEGATIVE
LDLC SERPL CALC-MCNC: 151 MG/DL
LEUKOCYTE ESTERASE URINE: NEGATIVE
LYMPHOCYTES # BLD AUTO: 2.98 K/UL
LYMPHOCYTES NFR BLD AUTO: 49.1 %
MAN DIFF?: NORMAL
MCHC RBC-ENTMCNC: 33.1 PG
MCHC RBC-ENTMCNC: 33.3 GM/DL
MCV RBC AUTO: 99.6 FL
MICROSCOPIC-UA: NORMAL
MONOCYTES # BLD AUTO: 0.52 K/UL
MONOCYTES NFR BLD AUTO: 8.6 %
NEUTROPHILS # BLD AUTO: 2.34 K/UL
NEUTROPHILS NFR BLD AUTO: 38.5 %
NITRITE URINE: NEGATIVE
PH URINE: 6
PLATELET # BLD AUTO: 235 K/UL
POTASSIUM SERPL-SCNC: 4.2 MMOL/L
PROT SERPL-MCNC: 7.2 G/DL
PROTEIN URINE: NEGATIVE
PSA SERPL-MCNC: 3.69 NG/ML
RBC # BLD: 4.8 M/UL
RBC # FLD: 13 %
RED BLOOD CELLS URINE: 2 /HPF
SODIUM SERPL-SCNC: 133 MMOL/L
SPECIFIC GRAVITY URINE: 1.02
SQUAMOUS EPITHELIAL CELLS: 0 /HPF
T4 FREE SERPL-MCNC: 1.3 NG/DL
TRIGL SERPL-MCNC: 125 MG/DL
TSH SERPL-ACNC: 2.77 UIU/ML
UROBILINOGEN URINE: NORMAL
WBC # FLD AUTO: 6.07 K/UL
WHITE BLOOD CELLS URINE: 0 /HPF

## 2020-11-14 LAB — HEMOCCULT STL QL IA: NEGATIVE

## 2020-12-01 DIAGNOSIS — Z20.828 CONTACT WITH AND (SUSPECTED) EXPOSURE TO OTHER VIRAL COMMUNICABLE DISEASES: ICD-10-CM

## 2020-12-21 ENCOUNTER — APPOINTMENT (OUTPATIENT)
Dept: RHEUMATOLOGY | Facility: CLINIC | Age: 70
End: 2020-12-21

## 2020-12-21 ENCOUNTER — APPOINTMENT (OUTPATIENT)
Dept: INTERNAL MEDICINE | Facility: CLINIC | Age: 70
End: 2020-12-21

## 2021-01-04 LAB
25(OH)D3 SERPL-MCNC: 41.7 NG/ML
ALBUMIN SERPL ELPH-MCNC: 4.3 G/DL
ALP BLD-CCNC: 58 U/L
ALT SERPL-CCNC: 22 U/L
ANION GAP SERPL CALC-SCNC: 14 MMOL/L
AST SERPL-CCNC: 20 U/L
BASOPHILS # BLD AUTO: 0.03 K/UL
BASOPHILS NFR BLD AUTO: 0.4 %
BILIRUB SERPL-MCNC: 0.4 MG/DL
BUN SERPL-MCNC: 20 MG/DL
CALCIUM SERPL-MCNC: 9.5 MG/DL
CHLORIDE SERPL-SCNC: 98 MMOL/L
CHOLEST SERPL-MCNC: 227 MG/DL
CO2 SERPL-SCNC: 20 MMOL/L
CREAT SERPL-MCNC: 0.97 MG/DL
EOSINOPHIL # BLD AUTO: 0.13 K/UL
EOSINOPHIL NFR BLD AUTO: 1.9 %
ESTIMATED AVERAGE GLUCOSE: 114 MG/DL
GLUCOSE SERPL-MCNC: 90 MG/DL
HBA1C MFR BLD HPLC: 5.6 %
HCT VFR BLD CALC: 45.1 %
HDLC SERPL-MCNC: 57 MG/DL
HGB BLD-MCNC: 15.4 G/DL
IMM GRANULOCYTES NFR BLD AUTO: 0.3 %
LDLC SERPL CALC-MCNC: 149 MG/DL
LYMPHOCYTES # BLD AUTO: 2.9 K/UL
LYMPHOCYTES NFR BLD AUTO: 42.5 %
MAN DIFF?: NORMAL
MCHC RBC-ENTMCNC: 33.8 PG
MCHC RBC-ENTMCNC: 34.1 GM/DL
MCV RBC AUTO: 98.9 FL
MONOCYTES # BLD AUTO: 0.53 K/UL
MONOCYTES NFR BLD AUTO: 7.8 %
NEUTROPHILS # BLD AUTO: 3.22 K/UL
NEUTROPHILS NFR BLD AUTO: 47.1 %
NONHDLC SERPL-MCNC: 170 MG/DL
PLATELET # BLD AUTO: 241 K/UL
POTASSIUM SERPL-SCNC: 4.4 MMOL/L
PROT SERPL-MCNC: 7.1 G/DL
PSA SERPL-MCNC: 5.67 NG/ML
RBC # BLD: 4.56 M/UL
RBC # FLD: 12.7 %
SARS-COV-2 IGG SERPL IA-ACNC: <0.1 INDEX
SARS-COV-2 IGG SERPL QL IA: NEGATIVE
SODIUM SERPL-SCNC: 133 MMOL/L
TRIGL SERPL-MCNC: 103 MG/DL
VIT B12 SERPL-MCNC: 624 PG/ML
WBC # FLD AUTO: 6.83 K/UL

## 2021-01-11 ENCOUNTER — NON-APPOINTMENT (OUTPATIENT)
Age: 71
End: 2021-01-11

## 2021-01-11 ENCOUNTER — APPOINTMENT (OUTPATIENT)
Dept: INTERNAL MEDICINE | Facility: CLINIC | Age: 71
End: 2021-01-11
Payer: MEDICARE

## 2021-01-11 VITALS
HEIGHT: 69.5 IN | BODY MASS INDEX: 26.06 KG/M2 | SYSTOLIC BLOOD PRESSURE: 122 MMHG | TEMPERATURE: 97.7 F | OXYGEN SATURATION: 97 % | HEART RATE: 71 BPM | DIASTOLIC BLOOD PRESSURE: 75 MMHG | WEIGHT: 180 LBS

## 2021-01-11 DIAGNOSIS — E55.9 VITAMIN D DEFICIENCY, UNSPECIFIED: ICD-10-CM

## 2021-01-11 DIAGNOSIS — N40.0 BENIGN PROSTATIC HYPERPLASIA WITHOUT LOWER URINARY TRACT SYMPMS: ICD-10-CM

## 2021-01-11 DIAGNOSIS — R97.20 ELEVATED PROSTATE, SPECIFIC ANTIGEN [PSA]: ICD-10-CM

## 2021-01-11 DIAGNOSIS — Z00.00 ENCOUNTER FOR GENERAL ADULT MEDICAL EXAMINATION W/OUT ABNORMAL FINDINGS: ICD-10-CM

## 2021-01-11 DIAGNOSIS — R73.09 OTHER ABNORMAL GLUCOSE: ICD-10-CM

## 2021-01-11 DIAGNOSIS — I10 ESSENTIAL (PRIMARY) HYPERTENSION: ICD-10-CM

## 2021-01-11 DIAGNOSIS — Z13.820 ENCOUNTER FOR SCREENING FOR OSTEOPOROSIS: ICD-10-CM

## 2021-01-11 DIAGNOSIS — H61.23 IMPACTED CERUMEN, BILATERAL: ICD-10-CM

## 2021-01-11 DIAGNOSIS — E78.5 HYPERLIPIDEMIA, UNSPECIFIED: ICD-10-CM

## 2021-01-11 PROCEDURE — G0444 DEPRESSION SCREEN ANNUAL: CPT | Mod: 59

## 2021-01-11 PROCEDURE — 93000 ELECTROCARDIOGRAM COMPLETE: CPT | Mod: 59

## 2021-01-11 PROCEDURE — G0439: CPT

## 2021-01-11 NOTE — HEALTH RISK ASSESSMENT
[0] : 2) Feeling down, depressed, or hopeless: Not at all (0) [Yes] : Yes [2 - 3 times a week (3 pts)] : 2 - 3  times a week (3 points) [Never (0 pts)] : Never (0 points) [No] : In the past 12 months have you used drugs other than those required for medical reasons? No [No falls in past year] : Patient reported no falls in the past year [Patient declined colonoscopy] : Patient declined colonoscopy [HIV test declined] : HIV test declined [None] : None [Alone] : lives alone [Retired] : retired [Single] : single [Feels Safe at Home] : Feels safe at home [Fully functional (bathing, dressing, toileting, transferring, walking, feeding)] : Fully functional (bathing, dressing, toileting, transferring, walking, feeding) [Fully functional (using the telephone, shopping, preparing meals, housekeeping, doing laundry, using] : Fully functional and needs no help or supervision to perform IADLs (using the telephone, shopping, preparing meals, housekeeping, doing laundry, using transportation, managing medications and managing finances) [Smoke Detector] : smoke detector [Carbon Monoxide Detector] : carbon monoxide detector [Seat Belt] :  uses seat belt [Sunscreen] : uses sunscreen [With Patient/Caregiver] : With Patient/Caregiver [Good] : ~his/her~  mood as  good [No Retinopathy] : No retinopathy [] : No [de-identified] : Several drinks per week; occasionally wine when out to dinner. [[id number]] : 0 [EyeExamDate] : 12/20 [Change in mental status noted] : No change in mental status noted [Language] : denies difficulty with language [Behavior] : denies difficulty with behavior [Handling Complex Tasks] : denies difficulty handling complex tasks [Reasoning] : denies difficulty with reasoning [Sexually Active] : not sexually active [Reports changes in hearing] : Reports no changes in hearing [Reports changes in vision] : Reports no changes in vision [Reports changes in dental health] : Reports no changes in dental health [ColonoscopyComments] : FIT negative 4/2/19 [HepatitisCDate] : 08/07/2014 [HepatitisCComments] : Negative [FreeTextEntry2] : Retired  [de-identified] : No h/o STDs. [de-identified] : Trouble with noisy environment. [de-identified] : Glasses, astigmatism, nearsighted.  Sees ophthalmologist. [de-identified] : Going regularly. [AdvancecareDate] : 11/06/2019 [FreeTextEntry4] : Patient given Health Care Proxy information in the past.

## 2021-01-11 NOTE — HISTORY OF PRESENT ILLNESS
[FreeTextEntry1] : Annual wellness visit  [de-identified] : Mr. BREEZY JUAN is a 70 year old man with pmhx of BPH, HTN, HLD, vitamin d deficiency who presents for annual wellness visit. He endorses being in overall good health and good mood. He has been having worsening urinary symptoms, nocturia and hesitancy. Has not taken tamsulosin in year. He has been taking lisinopril 40mg daily and supplements. 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 He has no other complaints.

## 2021-01-11 NOTE — ASSESSMENT
[FreeTextEntry1] : (1) HCM - Continue with diet, exercise, weight maintenance. Labs ordered as below. Hepatitis C screening was negative in 2014. HIV testing offered to patient and patient declined. Prevnar UTD (10/31/16), and he has received Pneumovax (1996, 2003) but has not gotten Pneumovax after 65, Zostavax (10/28/15), and Tdap (July 2011). He can get the Shingrix in pharmacy. Medicare does not cover shingrix vaccine in office.  Influenza vaccination given already this season. FIT negative Nov 2020. Dermatology follow-up for skin cancer screening. Patient was given written information on preparing a Health Care Proxy in the past by Dr. Hoang.\par \par (2) CV - patient's blood pressure is adequately controlled on lisinopril 10mg daily. Patient is on niacin for hyperlipidemia. Consider Zetia (Ezetimbe) given statin intolerance. \par \par (3)  - Restarting tamsulosin as per Dr. Scales. Patient was reminded to follow-up with urology for imaging.

## 2021-01-11 NOTE — PHYSICAL EXAM
[Well Nourished] : well nourished [Well Developed] : well developed [Well-Appearing] : well-appearing [Normal Voice/Communication] : normal voice/communication [Normal Sclera/Conjunctiva] : normal sclera/conjunctiva [PERRL] : pupils equal round and reactive to light [EOMI] : extraocular movements intact [Normal Outer Ear/Nose] : the outer ears and nose were normal in appearance [Normal Oropharynx] : the oropharynx was normal [No JVD] : no jugular venous distention [No Lymphadenopathy] : no lymphadenopathy [Supple] : supple [Thyroid Normal, No Nodules] : the thyroid was normal and there were no nodules present [No Respiratory Distress] : no respiratory distress  [No Accessory Muscle Use] : no accessory muscle use [Clear to Auscultation] : lungs were clear to auscultation bilaterally [Normal Rate] : normal rate  [Regular Rhythm] : with a regular rhythm [Normal S1, S2] : normal S1 and S2 [No Murmur] : no murmur heard [No Carotid Bruits] : no carotid bruits [No Abdominal Bruit] : a ~M bruit was not heard ~T in the abdomen [No Varicosities] : no varicosities [Pedal Pulses Present] : the pedal pulses are present [No Edema] : there was no peripheral edema [No Palpable Aorta] : no palpable aorta [No Extremity Clubbing/Cyanosis] : no extremity clubbing/cyanosis [Soft] : abdomen soft [Non Tender] : non-tender [Non-distended] : non-distended [No Masses] : no abdominal mass palpated [No HSM] : no HSM [Normal Bowel Sounds] : normal bowel sounds [No Hernias] : no hernias [Normal Sphincter Tone] : normal sphincter tone [No Mass] : no mass [Normal Posterior Cervical Nodes] : no posterior cervical lymphadenopathy [Normal Anterior Cervical Nodes] : no anterior cervical lymphadenopathy [No CVA Tenderness] : no CVA  tenderness [No Spinal Tenderness] : no spinal tenderness [No Joint Swelling] : no joint swelling [Grossly Normal Strength/Tone] : grossly normal strength/tone [Coordination Grossly Intact] : coordination grossly intact [No Rash] : no rash [No Focal Deficits] : no focal deficits [Normal Gait] : normal gait [Deep Tendon Reflexes (DTR)] : deep tendon reflexes were 2+ and symmetric [Normal Affect] : the affect was normal [Normal Insight/Judgement] : insight and judgment were intact [Comprehensive Foot Exam Normal] : Right and left foot were examined and both feet are normal. No ulcers in either foot. Toes are normal and with full ROM.  Normal tactile sensation with monofilament testing throughout both feet [Stool Occult Blood] : stool negative for occult blood [de-identified] : Digital rectal exam found no suspicious rectal masses. Anal tone is normal. The prostate is enlarged, non tender, with normal texture, discrete borders, and no nodules.

## 2021-01-13 LAB
APPEARANCE: CLEAR
BACTERIA: NEGATIVE
BILIRUBIN URINE: NEGATIVE
BLOOD URINE: NEGATIVE
COLOR: NORMAL
GLUCOSE QUALITATIVE U: NEGATIVE
HYALINE CASTS: 0 /LPF
KETONES URINE: NEGATIVE
LEUKOCYTE ESTERASE URINE: NEGATIVE
MICROSCOPIC-UA: NORMAL
NITRITE URINE: NEGATIVE
PH URINE: 6
PROTEIN URINE: NEGATIVE
RED BLOOD CELLS URINE: 1 /HPF
SPECIFIC GRAVITY URINE: 1.02
SQUAMOUS EPITHELIAL CELLS: 0 /HPF
UROBILINOGEN URINE: NORMAL
WHITE BLOOD CELLS URINE: 2 /HPF

## 2021-03-06 PROBLEM — Z13.820 SCREENING FOR OSTEOPOROSIS: Status: ACTIVE | Noted: 2021-03-06

## 2021-03-08 ENCOUNTER — RX RENEWAL (OUTPATIENT)
Age: 71
End: 2021-03-08

## 2021-03-18 DIAGNOSIS — Z23 ENCOUNTER FOR IMMUNIZATION: ICD-10-CM

## 2021-09-14 ENCOUNTER — RX RENEWAL (OUTPATIENT)
Age: 71
End: 2021-09-14

## 2022-01-12 ENCOUNTER — RX RENEWAL (OUTPATIENT)
Age: 72
End: 2022-01-12

## 2022-09-09 ENCOUNTER — RX RENEWAL (OUTPATIENT)
Age: 72
End: 2022-09-09